# Patient Record
Sex: FEMALE | Race: WHITE | Employment: OTHER | URBAN - METROPOLITAN AREA
[De-identification: names, ages, dates, MRNs, and addresses within clinical notes are randomized per-mention and may not be internally consistent; named-entity substitution may affect disease eponyms.]

---

## 2024-03-14 ENCOUNTER — OFFICE VISIT (OUTPATIENT)
Dept: WOUND CARE | Facility: HOSPITAL | Age: 89
End: 2024-03-14
Attending: NURSE PRACTITIONER
Payer: MEDICARE

## 2024-03-14 VITALS
RESPIRATION RATE: 17 BRPM | HEART RATE: 89 BPM | TEMPERATURE: 97 F | DIASTOLIC BLOOD PRESSURE: 60 MMHG | SYSTOLIC BLOOD PRESSURE: 101 MMHG | OXYGEN SATURATION: 94 % | WEIGHT: 130 LBS | BODY MASS INDEX: 22.2 KG/M2 | HEIGHT: 64 IN

## 2024-03-14 DIAGNOSIS — L89.312 PRESSURE INJURY OF RIGHT BUTTOCK, STAGE 2 (HCC): Primary | ICD-10-CM

## 2024-03-14 PROCEDURE — 99214 OFFICE O/P EST MOD 30 MIN: CPT | Performed by: NURSE PRACTITIONER

## 2024-03-14 NOTE — PROGRESS NOTES
Subjective   Karen Ramos is a 89 year old female.    Chief Complaint   Patient presents with    Wound Care     Rt buttock     HPI  3/14/2024: Patient is here with her , her  provided the information she dose not walk any more, either sits in w/chair or sleeps in the bed. She has developed ulceration on the right buttocks for more than six weeks. Patient's  taken her to [facility] and started her on medi-honey gel dressing with Mepilex.      Review of Systems   Constitutional:  Positive for activity change. Negative for chills and fatigue.   HENT:  Negative for congestion.    Respiratory:  Negative for cough and shortness of breath.    Cardiovascular:  Negative for chest pain and leg swelling.   Gastrointestinal:  Negative for constipation.   Musculoskeletal:  Positive for arthralgias and gait problem.   Skin:  Positive for wound.        Right buttocks wound   Neurological:  Positive for weakness.   Psychiatric/Behavioral:  Negative for agitation.      Objective   Objective  Physical Exam  Vitals reviewed.   Constitutional:       General: She is awake.      Appearance: She is normal weight.   HENT:      Head: Normocephalic.   Cardiovascular:      Rate and Rhythm: Normal rate and regular rhythm.      Pulses: Normal pulses.      Heart sounds: Normal heart sounds.   Pulmonary:      Effort: Pulmonary effort is normal.      Breath sounds: Normal breath sounds.   Abdominal:      General: Bowel sounds are normal.   Musculoskeletal:      Cervical back: Normal range of motion.      Right lower leg: No edema.      Left lower leg: No edema.   Skin:     General: Skin is warm and dry.      Capillary Refill: Capillary refill takes 2 to 3 seconds.   Neurological:      Mental Status: She is confused.      Motor: Weakness and atrophy present.      Gait: Gait abnormal.   Psychiatric:         Mood and Affect: Mood normal.     Wound Assessment  Wound 03/14/24 1 Buttocks Right (Active)   Date First Assessed:  03/14/24    Wound Number (Wound Clinic Only): 1  Location: Buttocks  Wound Location Orientation: Right      Assessments 3/14/2024  2:03 PM   Wound Image     Site Assessment Dry;Pink;Yellow   Closure Not approximated   Drainage Amount Small   Drainage Description Yellow   Treatments Cleansed;Wound ;Topical (Barrier/Moisturizer/Ointment)   Dressing Open to air   Dressing Changed New   Wound Length (cm) 0.3 cm   Wound Width (cm) 1 cm   Wound Surface Area (cm^2) 0.3 cm^2   Wound Depth (cm) 0.1 cm   Wound Volume (cm^3) 0.03 cm^3   Margins Attached edges;Well-defined edges   Non-staged Wound Description Partial thickness   Lc-wound Assessment Hyperpigmented;Induration;Dry;Purple   Wound Bed Epithelium (%) 95 %   State of Healing Non-healing   Wound Odor None   Tunneling? No   Undermining? No   Sinus Tracts? No       Inactive Orders   Date Order Priority Status Authorizing Provider   03/14/24 1654 OP Wound Dressing Routine Completed Rahul Hernandes APRN     - Cleansing:    Cleanse with normal saline or wound cleanser     - Additional Wound Dressing Information:    Zinc oxide to wound and periwound     - Frequency:    Change dressing two times a day      Vital Signs  Vital Signs    03/14/24 1410   BP: 101/60   Pulse: 89   Resp: 17   Temp: 97.3 °F (36.3 °C)   Allergies  No Known Allergies    Assessment   Right buttock , pressure ulcer stage 2:  -slough cover wound bed, no erythema in the lc-wound  -excoriation and skin tear around the wound about the tape    Reviewed note and labs in epic and care every where.  Recent labs: 5/25/2024:  BUN 21.0, Creatinine 0.46    Encounter Diagnosis  1. Pressure injury of right buttock, stage 2 (HCC)      Problem List  Patient Active Problem List   Diagnosis    Pudendal neuralgia    Hypothyroidism    HLD (hyperlipidemia)    Elevated coronary artery calcium score    Balance disorder    Pressure injury of right buttock, stage 2 (HCC)     Plan  Orders  Orders Placed This Encounter    Procedures    OP Wound Dressing   Risk factors: Lack of mobility, low BMI, poor nutrition, incontinent, the need to sleep on the chair or with elevated.  Discussed the etiology of the pressure ulcer and treatment and management of these wounds. Initiated zinc oxide cream (Desetin cream) to seal the wound, patient's skin is very fragile and application of tape has cause skin tear. May apply zinc oxide cream daily. May also continue use medi-honey and bordered foam, however, be careful skin damage related to adhesive tape.   Discussed body posture and sitting positions that exacerbates the tissue injury.  Discussed pressure prevention techniques, recommended pressure relieving surfaces such as Ehob cushion or egg crate foams to relieve pressure from bony prominence.  Discussed signs and symptoms of infection, encourage patient to assess skin daily.  Discussed moisturizing skin to prevent dry skin, refrain from itching or scratching the skin.  Discussed nutrition for wound healing and encourage increase protein intake, and healthy diet.   Hand out given to patient regarding Patient and Caregiver Guide to prevention and Nutrition for wound healing.      Discussed the treatment plan with patient, patient verbalized understanding and agreed to these plan.  Total face to face time was 40min, more than 50% of the time was spent in counseling and/or coordination of care related to his diagnosis and plan of care.   Follow-Up  Return in about 4 weeks (around 4/11/2024) for APN x 30 minutes, or sooner with worsening wound condition..